# Patient Record
Sex: FEMALE | Race: BLACK OR AFRICAN AMERICAN | NOT HISPANIC OR LATINO | Employment: UNEMPLOYED | URBAN - METROPOLITAN AREA
[De-identification: names, ages, dates, MRNs, and addresses within clinical notes are randomized per-mention and may not be internally consistent; named-entity substitution may affect disease eponyms.]

---

## 2019-07-09 PROBLEM — E66.01 OBESITY, CLASS III, BMI 40-49.9 (MORBID OBESITY): Status: ACTIVE | Noted: 2019-07-09

## 2019-07-09 PROBLEM — D64.9 ANEMIA: Status: ACTIVE | Noted: 2019-07-09

## 2019-07-09 PROBLEM — R73.03 PRE-DIABETES: Status: ACTIVE | Noted: 2019-07-09

## 2019-07-09 PROBLEM — I10 HYPERTENSION: Status: ACTIVE | Noted: 2019-07-09

## 2020-08-14 PROBLEM — I10 HTN (HYPERTENSION): Status: ACTIVE | Noted: 2018-03-16

## 2021-05-19 ENCOUNTER — PATIENT OUTREACH (OUTPATIENT)
Dept: ADMINISTRATIVE | Facility: HOSPITAL | Age: 36
End: 2021-05-19

## 2021-06-02 PROBLEM — K04.7 DENTAL INFECTION: Status: ACTIVE | Noted: 2021-06-02

## 2021-09-13 PROBLEM — D50.0 IRON DEFICIENCY ANEMIA DUE TO CHRONIC BLOOD LOSS: Status: ACTIVE | Noted: 2019-07-09

## 2021-09-13 PROBLEM — N94.6 DYSMENORRHEA: Status: ACTIVE | Noted: 2021-09-13

## 2021-09-13 PROBLEM — K04.7 DENTAL INFECTION: Status: RESOLVED | Noted: 2021-06-02 | Resolved: 2021-09-13

## 2022-03-17 PROBLEM — N93.9 ABNORMAL UTERINE BLEEDING DUE TO ADENOMYOSIS: Status: ACTIVE | Noted: 2022-03-17

## 2022-03-17 PROBLEM — N80.03 ABNORMAL UTERINE BLEEDING DUE TO ADENOMYOSIS: Status: ACTIVE | Noted: 2022-03-17

## 2022-04-26 PROBLEM — G89.29 CHRONIC PAIN OF RIGHT KNEE: Status: ACTIVE | Noted: 2022-04-26

## 2022-04-26 PROBLEM — M25.561 CHRONIC PAIN OF RIGHT KNEE: Status: ACTIVE | Noted: 2022-04-26

## 2022-04-26 PROBLEM — Z00.00 ENCOUNTER FOR HEALTH MAINTENANCE EXAMINATION IN ADULT: Status: ACTIVE | Noted: 2022-04-26

## 2022-08-01 PROBLEM — Z00.00 ENCOUNTER FOR HEALTH MAINTENANCE EXAMINATION IN ADULT: Status: RESOLVED | Noted: 2022-04-26 | Resolved: 2022-08-01

## 2023-01-10 PROBLEM — M79.89 LEG SWELLING: Status: ACTIVE | Noted: 2023-01-10

## 2023-01-10 PROBLEM — K08.89 TOOTH PAIN: Status: ACTIVE | Noted: 2023-01-10

## 2023-01-10 PROBLEM — K04.7 TOOTH ABSCESS: Status: ACTIVE | Noted: 2023-01-10

## 2023-01-10 PROBLEM — H53.8 BLURRED VISION, BILATERAL: Status: ACTIVE | Noted: 2023-01-10

## 2023-05-16 PROBLEM — E66.01 MORBID OBESITY WITH BODY MASS INDEX OF 50 OR HIGHER: Status: ACTIVE | Noted: 2023-05-16

## 2023-05-16 PROBLEM — E78.2 MIXED HYPERLIPIDEMIA: Status: ACTIVE | Noted: 2023-05-16

## 2023-05-16 PROBLEM — R73.03 PREDIABETES: Status: ACTIVE | Noted: 2023-05-16

## 2023-05-16 PROBLEM — Z00.00 HEALTHCARE MAINTENANCE: Status: ACTIVE | Noted: 2023-05-16

## 2023-05-16 PROBLEM — Z11.51 ENCOUNTER FOR SCREENING FOR HUMAN PAPILLOMAVIRUS (HPV): Status: ACTIVE | Noted: 2023-05-16

## 2023-08-21 PROBLEM — Z00.00 HEALTHCARE MAINTENANCE: Status: RESOLVED | Noted: 2023-05-16 | Resolved: 2023-08-21

## 2025-02-19 PROBLEM — M54.9 MID BACK PAIN: Status: ACTIVE | Noted: 2025-02-19

## 2025-02-19 PROBLEM — J02.9 SORE THROAT: Status: ACTIVE | Noted: 2025-02-19

## 2025-02-20 PROBLEM — M25.561 CHRONIC PAIN OF RIGHT KNEE: Status: RESOLVED | Noted: 2022-04-26 | Resolved: 2025-02-20

## 2025-02-20 PROBLEM — K04.7 TOOTH ABSCESS: Status: RESOLVED | Noted: 2023-01-10 | Resolved: 2025-02-20

## 2025-02-20 PROBLEM — G89.29 CHRONIC PAIN OF RIGHT KNEE: Status: RESOLVED | Noted: 2022-04-26 | Resolved: 2025-02-20

## 2025-02-20 PROBLEM — R73.03 PRE-DIABETES: Status: RESOLVED | Noted: 2019-07-09 | Resolved: 2025-02-20

## 2025-02-20 PROBLEM — M79.89 LEG SWELLING: Status: RESOLVED | Noted: 2023-01-10 | Resolved: 2025-02-20

## 2025-02-20 PROBLEM — Z11.51 ENCOUNTER FOR SCREENING FOR HUMAN PAPILLOMAVIRUS (HPV): Status: RESOLVED | Noted: 2023-05-16 | Resolved: 2025-02-20

## 2025-02-20 PROBLEM — K08.89 TOOTH PAIN: Status: RESOLVED | Noted: 2023-01-10 | Resolved: 2025-02-20

## 2025-02-20 PROBLEM — H53.8 BLURRED VISION, BILATERAL: Status: RESOLVED | Noted: 2023-01-10 | Resolved: 2025-02-20

## 2025-06-20 PROBLEM — E55.9 VITAMIN D DEFICIENCY: Status: ACTIVE | Noted: 2025-06-20

## 2025-06-20 PROBLEM — J45.20 MILD INTERMITTENT ASTHMA WITHOUT COMPLICATION: Status: ACTIVE | Noted: 2025-06-20

## 2025-06-20 PROBLEM — E11.9 TYPE 2 DIABETES MELLITUS WITHOUT COMPLICATION, WITHOUT LONG-TERM CURRENT USE OF INSULIN: Status: ACTIVE | Noted: 2025-06-20

## 2025-06-20 PROBLEM — D50.0 IRON DEFICIENCY ANEMIA DUE TO CHRONIC BLOOD LOSS: Status: RESOLVED | Noted: 2019-07-09 | Resolved: 2025-06-20

## 2025-06-20 PROBLEM — R73.03 PREDIABETES: Status: RESOLVED | Noted: 2023-05-16 | Resolved: 2025-06-20

## 2025-06-24 ENCOUNTER — TELEPHONE (OUTPATIENT)
Dept: BARIATRICS | Facility: CLINIC | Age: 40
End: 2025-06-24
Payer: MEDICAID

## 2025-07-09 ENCOUNTER — TELEPHONE (OUTPATIENT)
Dept: SURGERY | Facility: CLINIC | Age: 40
End: 2025-07-09
Payer: MEDICAID

## 2025-07-09 NOTE — TELEPHONE ENCOUNTER
Received referral for our Medicaid Program interested in WLS.   [x] Does NOT meet criteria for Weight Loss Surgery    [x] Documented Non-compliance - multiple no shows and cancellations related to appointments and blood pressure checks     Referring provider notified and will notify patient.